# Patient Record
Sex: FEMALE | Race: WHITE | NOT HISPANIC OR LATINO | Employment: UNEMPLOYED | URBAN - METROPOLITAN AREA
[De-identification: names, ages, dates, MRNs, and addresses within clinical notes are randomized per-mention and may not be internally consistent; named-entity substitution may affect disease eponyms.]

---

## 2017-01-13 ENCOUNTER — ALLSCRIPTS OFFICE VISIT (OUTPATIENT)
Dept: OTHER | Facility: OTHER | Age: 42
End: 2017-01-13

## 2017-01-13 ENCOUNTER — TRANSCRIBE ORDERS (OUTPATIENT)
Dept: LAB | Facility: CLINIC | Age: 42
End: 2017-01-13

## 2017-01-13 DIAGNOSIS — R73.09 OTHER ABNORMAL GLUCOSE: ICD-10-CM

## 2017-01-13 DIAGNOSIS — E66.01 MORBID (SEVERE) OBESITY DUE TO EXCESS CALORIES (HCC): ICD-10-CM

## 2017-01-13 DIAGNOSIS — F32.9 MAJOR DEPRESSIVE DISORDER, SINGLE EPISODE: ICD-10-CM

## 2017-01-14 ENCOUNTER — GENERIC CONVERSION - ENCOUNTER (OUTPATIENT)
Dept: OTHER | Facility: OTHER | Age: 42
End: 2017-01-14

## 2017-01-14 LAB
A/G RATIO (HISTORICAL): 1.7 (ref 1.1–2.5)
ALBUMIN SERPL BCP-MCNC: 4.7 G/DL (ref 3.5–5.5)
ALP SERPL-CCNC: 54 IU/L (ref 39–117)
ALT SERPL W P-5'-P-CCNC: 45 IU/L (ref 0–32)
AMBIG ABBREV CMP14 DEFAULT (HISTORICAL): NORMAL
AMBIG ABBREV LP DEFAULT (HISTORICAL): NORMAL
AST SERPL W P-5'-P-CCNC: 31 IU/L (ref 0–40)
BASOPHILS # BLD AUTO: 0 X10E3/UL (ref 0–0.2)
BASOPHILS # BLD AUTO: 1 %
BILIRUB SERPL-MCNC: 0.4 MG/DL (ref 0–1.2)
BUN SERPL-MCNC: 16 MG/DL (ref 6–24)
BUN/CREA RATIO (HISTORICAL): 24 (ref 9–23)
CALCIUM SERPL-MCNC: 9.6 MG/DL (ref 8.7–10.2)
CHLORIDE SERPL-SCNC: 100 MMOL/L (ref 96–106)
CO2 SERPL-SCNC: 25 MMOL/L (ref 18–29)
CREAT SERPL-MCNC: 0.66 MG/DL (ref 0.57–1)
DEPRECATED RDW RBC AUTO: 13.2 % (ref 12.3–15.4)
EGFR AFRICAN AMERICAN (HISTORICAL): 124 ML/MIN/1.73
EGFR-AMERICAN CALC (HISTORICAL): 108 ML/MIN/1.73
EOSINOPHIL # BLD AUTO: 0 %
EOSINOPHIL # BLD AUTO: 0 X10E3/UL (ref 0–0.4)
GLUCOSE SERPL-MCNC: 112 MG/DL (ref 65–99)
HCT VFR BLD AUTO: 43.7 % (ref 34–46.6)
HGB BLD-MCNC: 15.1 G/DL (ref 11.1–15.9)
IMM.GRANULOCYTES (CD4/8) (HISTORICAL): 0 X10E3/UL (ref 0–0.1)
IMM.GRANULOCYTES (CD4/8) (HISTORICAL): 1 %
LYMPHOCYTES # BLD AUTO: 1 X10E3/UL (ref 0.7–3.1)
LYMPHOCYTES # BLD AUTO: 16 %
MCH RBC QN AUTO: 32.5 PG (ref 26.6–33)
MCHC RBC AUTO-ENTMCNC: 34.6 G/DL (ref 31.5–35.7)
MCV RBC AUTO: 94 FL (ref 79–97)
MONOCYTES # BLD AUTO: 1.1 X10E3/UL (ref 0.1–0.9)
MONOCYTES (HISTORICAL): 17 %
NEUTROPHILS # BLD AUTO: 4.2 X10E3/UL (ref 1.4–7)
NEUTROPHILS # BLD AUTO: 65 %
PLATELET # BLD AUTO: 329 X10E3/UL (ref 150–379)
POTASSIUM SERPL-SCNC: 4.7 MMOL/L (ref 3.5–5.2)
RBC (HISTORICAL): 4.65 X10E6/UL (ref 3.77–5.28)
SODIUM SERPL-SCNC: 143 MMOL/L (ref 134–144)
TOT. GLOBULIN, SERUM (HISTORICAL): 2.8 G/DL (ref 1.5–4.5)
TOTAL PROTEIN (HISTORICAL): 7.5 G/DL (ref 6–8.5)
WBC # BLD AUTO: 6.4 X10E3/UL (ref 3.4–10.8)

## 2017-01-15 LAB
BACTERIA UR QL AUTO: NORMAL
BILIRUB UR QL STRIP: NEGATIVE
CHOLEST SERPL-MCNC: 168 MG/DL (ref 100–199)
COLOR UR: YELLOW
COMMENT (HISTORICAL): CLEAR
FECAL OCCULT BLOOD DIAGNOSTIC (HISTORICAL): NEGATIVE
GLUCOSE (HISTORICAL): NEGATIVE
HBA1C MFR BLD HPLC: 5.4 % (ref 4.8–5.6)
HDLC SERPL-MCNC: 87 MG/DL
KETONES UR STRIP-MCNC: NEGATIVE MG/DL
LDLC SERPL CALC-MCNC: 61 MG/DL (ref 0–99)
LEUKOCYTE ESTERASE UR QL STRIP: NEGATIVE
MICROSCOPIC EXAMINATION (HISTORICAL): NORMAL
MICROSCOPIC EXAMINATION (HISTORICAL): NORMAL
MUCUS THREADS (HISTORICAL): PRESENT
NITRITE UR QL STRIP: NEGATIVE
NON-SQ EPI CELLS URNS QL MICRO: NORMAL /HPF
PH UR STRIP.AUTO: 6.5 [PH] (ref 5–7.5)
PROT UR STRIP-MCNC: NEGATIVE MG/DL
RBC (HISTORICAL): NORMAL /HPF
SP GR UR STRIP.AUTO: 1.02 (ref 1–1.03)
TRIGL SERPL-MCNC: 101 MG/DL (ref 0–149)
TSH SERPL DL<=0.05 MIU/L-ACNC: 1.12 UIU/ML (ref 0.45–4.5)
URINALYSIS (UA) (HISTORICAL): NORMAL
UROBILINOGEN UR QL STRIP.AUTO: 1 EU/DL (ref 0.2–1)
VLDLC SERPL CALC-MCNC: 20 MG/DL (ref 5–40)
WBC # BLD AUTO: NORMAL /HPF

## 2017-03-09 ENCOUNTER — TRANSCRIBE ORDERS (OUTPATIENT)
Dept: ADMINISTRATIVE | Facility: HOSPITAL | Age: 42
End: 2017-03-09

## 2017-03-09 ENCOUNTER — ALLSCRIPTS OFFICE VISIT (OUTPATIENT)
Dept: OTHER | Facility: OTHER | Age: 42
End: 2017-03-09

## 2017-03-09 DIAGNOSIS — R94.5 ABNORMAL RESULTS OF LIVER FUNCTION STUDIES: ICD-10-CM

## 2017-03-09 DIAGNOSIS — G35 MULTIPLE SCLEROSIS (HCC): Primary | ICD-10-CM

## 2017-03-09 DIAGNOSIS — G35 MULTIPLE SCLEROSIS (HCC): ICD-10-CM

## 2017-03-10 ENCOUNTER — APPOINTMENT (OUTPATIENT)
Dept: LAB | Facility: HOSPITAL | Age: 42
End: 2017-03-10
Attending: PSYCHIATRY & NEUROLOGY
Payer: COMMERCIAL

## 2017-03-10 ENCOUNTER — GENERIC CONVERSION - ENCOUNTER (OUTPATIENT)
Dept: OTHER | Facility: OTHER | Age: 42
End: 2017-03-10

## 2017-03-10 DIAGNOSIS — G35 MULTIPLE SCLEROSIS (HCC): ICD-10-CM

## 2017-03-10 LAB
ALBUMIN SERPL BCP-MCNC: 3.8 G/DL (ref 3.5–5)
ALP SERPL-CCNC: 70 U/L (ref 46–116)
ALT SERPL W P-5'-P-CCNC: 91 U/L (ref 12–78)
ANION GAP SERPL CALCULATED.3IONS-SCNC: 8 MMOL/L (ref 4–13)
AST SERPL W P-5'-P-CCNC: 46 U/L (ref 5–45)
BASOPHILS # BLD AUTO: 0.02 THOUSANDS/ΜL (ref 0–0.1)
BASOPHILS NFR BLD AUTO: 0 % (ref 0–1)
BILIRUB SERPL-MCNC: 0.4 MG/DL (ref 0.2–1)
BUN SERPL-MCNC: 18 MG/DL (ref 5–25)
CALCIUM SERPL-MCNC: 9.4 MG/DL (ref 8.3–10.1)
CHLORIDE SERPL-SCNC: 103 MMOL/L (ref 100–108)
CO2 SERPL-SCNC: 30 MMOL/L (ref 21–32)
CREAT SERPL-MCNC: 0.77 MG/DL (ref 0.6–1.3)
EOSINOPHIL # BLD AUTO: 0 THOUSAND/ΜL (ref 0–0.61)
EOSINOPHIL NFR BLD AUTO: 0 % (ref 0–6)
ERYTHROCYTE [DISTWIDTH] IN BLOOD BY AUTOMATED COUNT: 12.6 % (ref 11.6–15.1)
GFR SERPL CREATININE-BSD FRML MDRD: >60 ML/MIN/1.73SQ M
GLUCOSE SERPL-MCNC: 82 MG/DL (ref 65–140)
HCT VFR BLD AUTO: 42.7 % (ref 34.8–46.1)
HGB BLD-MCNC: 14.5 G/DL (ref 11.5–15.4)
LYMPHOCYTES # BLD AUTO: 1.64 THOUSANDS/ΜL (ref 0.6–4.47)
LYMPHOCYTES NFR BLD AUTO: 25 % (ref 14–44)
MCH RBC QN AUTO: 32.9 PG (ref 26.8–34.3)
MCHC RBC AUTO-ENTMCNC: 34 G/DL (ref 31.4–37.4)
MCV RBC AUTO: 97 FL (ref 82–98)
MONOCYTES # BLD AUTO: 0.77 THOUSAND/ΜL (ref 0.17–1.22)
MONOCYTES NFR BLD AUTO: 12 % (ref 4–12)
NEUTROPHILS # BLD AUTO: 4.07 THOUSANDS/ΜL (ref 1.85–7.62)
NEUTS SEG NFR BLD AUTO: 62 % (ref 43–75)
NRBC BLD AUTO-RTO: 0 /100 WBCS
PLATELET # BLD AUTO: 333 THOUSANDS/UL (ref 149–390)
PMV BLD AUTO: 9.8 FL (ref 8.9–12.7)
POTASSIUM SERPL-SCNC: 4.1 MMOL/L (ref 3.5–5.3)
PROT SERPL-MCNC: 7.6 G/DL (ref 6.4–8.2)
RBC # BLD AUTO: 4.41 MILLION/UL (ref 3.81–5.12)
SODIUM SERPL-SCNC: 141 MMOL/L (ref 136–145)
WBC # BLD AUTO: 6.56 THOUSAND/UL (ref 4.31–10.16)

## 2017-03-10 PROCEDURE — 80053 COMPREHEN METABOLIC PANEL: CPT

## 2017-03-10 PROCEDURE — 85025 COMPLETE CBC W/AUTO DIFF WBC: CPT

## 2017-03-10 PROCEDURE — 83520 IMMUNOASSAY QUANT NOS NONAB: CPT

## 2017-03-10 PROCEDURE — 36415 COLL VENOUS BLD VENIPUNCTURE: CPT

## 2017-03-13 LAB — AQP4 H2O CHANNEL AB SERPL IA-ACNC: <1.5 U/ML (ref 0–3)

## 2017-03-21 ENCOUNTER — APPOINTMENT (OUTPATIENT)
Dept: LAB | Facility: HOSPITAL | Age: 42
End: 2017-03-21
Payer: COMMERCIAL

## 2017-03-21 ENCOUNTER — ALLSCRIPTS OFFICE VISIT (OUTPATIENT)
Dept: OTHER | Facility: OTHER | Age: 42
End: 2017-03-21

## 2017-03-21 ENCOUNTER — TRANSCRIBE ORDERS (OUTPATIENT)
Dept: LAB | Facility: HOSPITAL | Age: 42
End: 2017-03-21

## 2017-03-21 DIAGNOSIS — R94.5 NONSPECIFIC ABNORMAL RESULTS OF LIVER FUNCTION STUDY: ICD-10-CM

## 2017-03-21 DIAGNOSIS — R94.5 ABNORMAL RESULTS OF LIVER FUNCTION STUDIES: ICD-10-CM

## 2017-03-21 DIAGNOSIS — R94.5 NONSPECIFIC ABNORMAL RESULTS OF LIVER FUNCTION STUDY: Primary | ICD-10-CM

## 2017-03-21 LAB
ALBUMIN SERPL BCP-MCNC: 3.7 G/DL (ref 3.5–5)
ALP SERPL-CCNC: 63 U/L (ref 46–116)
ALT SERPL W P-5'-P-CCNC: 64 U/L (ref 12–78)
AST SERPL W P-5'-P-CCNC: 28 U/L (ref 5–45)
BILIRUB DIRECT SERPL-MCNC: 0.13 MG/DL (ref 0–0.2)
BILIRUB SERPL-MCNC: 0.5 MG/DL (ref 0.2–1)
ERYTHROCYTE [DISTWIDTH] IN BLOOD BY AUTOMATED COUNT: 12.6 % (ref 11.6–15.1)
HCT VFR BLD AUTO: 44.2 % (ref 34.8–46.1)
HGB BLD-MCNC: 15.1 G/DL (ref 11.5–15.4)
INR PPP: 0.96 (ref 0.86–1.16)
MCH RBC QN AUTO: 33 PG (ref 26.8–34.3)
MCHC RBC AUTO-ENTMCNC: 34.2 G/DL (ref 31.4–37.4)
MCV RBC AUTO: 97 FL (ref 82–98)
PLATELET # BLD AUTO: 332 THOUSANDS/UL (ref 149–390)
PMV BLD AUTO: 10.2 FL (ref 8.9–12.7)
PROT SERPL-MCNC: 7.5 G/DL (ref 6.4–8.2)
PROTHROMBIN TIME: 12.7 SECONDS (ref 12–14.3)
RBC # BLD AUTO: 4.57 MILLION/UL (ref 3.81–5.12)
WBC # BLD AUTO: 7.28 THOUSAND/UL (ref 4.31–10.16)

## 2017-03-21 PROCEDURE — 87350 HEPATITIS BE AG IA: CPT

## 2017-03-21 PROCEDURE — 86704 HEP B CORE ANTIBODY TOTAL: CPT

## 2017-03-21 PROCEDURE — 85027 COMPLETE CBC AUTOMATED: CPT

## 2017-03-21 PROCEDURE — 82390 ASSAY OF CERULOPLASMIN: CPT

## 2017-03-21 PROCEDURE — 80076 HEPATIC FUNCTION PANEL: CPT

## 2017-03-21 PROCEDURE — 86803 HEPATITIS C AB TEST: CPT

## 2017-03-21 PROCEDURE — 86256 FLUORESCENT ANTIBODY TITER: CPT

## 2017-03-21 PROCEDURE — 86708 HEPATITIS A ANTIBODY: CPT

## 2017-03-21 PROCEDURE — 36415 COLL VENOUS BLD VENIPUNCTURE: CPT

## 2017-03-21 PROCEDURE — 85610 PROTHROMBIN TIME: CPT

## 2017-03-21 PROCEDURE — 87340 HEPATITIS B SURFACE AG IA: CPT

## 2017-03-22 ENCOUNTER — HOSPITAL ENCOUNTER (OUTPATIENT)
Dept: MRI IMAGING | Facility: CLINIC | Age: 42
Discharge: HOME | End: 2017-03-22
Payer: COMMERCIAL

## 2017-03-22 DIAGNOSIS — G35 MULTIPLE SCLEROSIS (HCC): ICD-10-CM

## 2017-03-22 LAB
CERULOPLASMIN SERPL-MCNC: 31.9 MG/DL (ref 19–39)
HAV AB SER QL IA: REACTIVE
HBV CORE AB SER QL: NORMAL
HBV E AG SERPL QL IA: NEGATIVE
HBV SURFACE AG SER QL: NORMAL
HCV AB SER QL: NORMAL
MITOCHONDRIA M2 IGG SER-ACNC: 3.5 UNITS (ref 0–20)

## 2017-03-22 PROCEDURE — A9585 GADOBUTROL INJECTION: HCPCS | Performed by: PSYCHIATRY & NEUROLOGY

## 2017-03-22 PROCEDURE — 70553 MRI BRAIN STEM W/O & W/DYE: CPT

## 2017-03-22 RX ADMIN — GADOBUTROL 10 ML: 604.72 INJECTION INTRAVENOUS at 12:41

## 2017-03-24 ENCOUNTER — HOSPITAL ENCOUNTER (OUTPATIENT)
Dept: MRI IMAGING | Facility: CLINIC | Age: 42
Discharge: HOME | End: 2017-03-24
Payer: COMMERCIAL

## 2017-03-24 DIAGNOSIS — G35 MULTIPLE SCLEROSIS (HCC): ICD-10-CM

## 2017-03-24 PROCEDURE — A9585 GADOBUTROL INJECTION: HCPCS | Performed by: PSYCHIATRY & NEUROLOGY

## 2017-03-24 PROCEDURE — 72157 MRI CHEST SPINE W/O & W/DYE: CPT

## 2017-03-24 PROCEDURE — 72156 MRI NECK SPINE W/O & W/DYE: CPT

## 2017-03-24 RX ADMIN — GADOBUTROL 12 ML: 604.72 INJECTION INTRAVENOUS at 14:41

## 2017-03-29 ENCOUNTER — HOSPITAL ENCOUNTER (OUTPATIENT)
Dept: ULTRASOUND IMAGING | Facility: HOSPITAL | Age: 42
Discharge: HOME | End: 2017-03-29
Payer: COMMERCIAL

## 2017-03-29 DIAGNOSIS — R94.5 ABNORMAL RESULTS OF LIVER FUNCTION STUDIES: ICD-10-CM

## 2017-03-29 PROCEDURE — 76700 US EXAM ABDOM COMPLETE: CPT

## 2017-04-05 ENCOUNTER — ALLSCRIPTS OFFICE VISIT (OUTPATIENT)
Dept: OTHER | Facility: OTHER | Age: 42
End: 2017-04-05

## 2017-04-05 ENCOUNTER — GENERIC CONVERSION - ENCOUNTER (OUTPATIENT)
Dept: OTHER | Facility: OTHER | Age: 42
End: 2017-04-05

## 2017-04-18 ENCOUNTER — TRANSCRIBE ORDERS (OUTPATIENT)
Dept: ADMINISTRATIVE | Facility: HOSPITAL | Age: 42
End: 2017-04-18

## 2017-04-18 DIAGNOSIS — R25.1 TREMOR: Primary | ICD-10-CM

## 2017-04-18 DIAGNOSIS — G35 MULTIPLE SCLEROSIS (HCC): ICD-10-CM

## 2017-04-24 ENCOUNTER — HOSPITAL ENCOUNTER (OUTPATIENT)
Dept: NEUROLOGY | Facility: HOSPITAL | Age: 42
Discharge: HOME | End: 2017-04-24
Attending: PSYCHIATRY & NEUROLOGY
Payer: COMMERCIAL

## 2017-04-24 DIAGNOSIS — R25.1 TREMOR: ICD-10-CM

## 2017-04-24 PROCEDURE — 95816 EEG AWAKE AND DROWSY: CPT

## 2017-04-26 ENCOUNTER — ALLSCRIPTS OFFICE VISIT (OUTPATIENT)
Dept: OTHER | Facility: OTHER | Age: 42
End: 2017-04-26

## 2017-05-18 ENCOUNTER — GENERIC CONVERSION - ENCOUNTER (OUTPATIENT)
Dept: OTHER | Facility: OTHER | Age: 42
End: 2017-05-18

## 2017-05-19 ENCOUNTER — ALLSCRIPTS OFFICE VISIT (OUTPATIENT)
Dept: OTHER | Facility: OTHER | Age: 42
End: 2017-05-19

## 2017-05-19 DIAGNOSIS — G35 MULTIPLE SCLEROSIS (HCC): ICD-10-CM

## 2017-05-23 ENCOUNTER — HOSPITAL ENCOUNTER (OUTPATIENT)
Dept: NEUROLOGY | Facility: HOSPITAL | Age: 42
Discharge: HOME | End: 2017-05-23
Attending: PSYCHIATRY & NEUROLOGY
Payer: COMMERCIAL

## 2017-05-23 ENCOUNTER — GENERIC CONVERSION - ENCOUNTER (OUTPATIENT)
Dept: OTHER | Facility: OTHER | Age: 42
End: 2017-05-23

## 2017-05-23 DIAGNOSIS — G35 MULTIPLE SCLEROSIS (HCC): ICD-10-CM

## 2017-05-23 PROCEDURE — 95819 EEG AWAKE AND ASLEEP: CPT

## 2017-07-26 ENCOUNTER — HOSPITAL ENCOUNTER (OUTPATIENT)
Dept: RADIOLOGY | Facility: HOSPITAL | Age: 42
Discharge: HOME | End: 2017-07-26
Payer: COMMERCIAL

## 2017-07-26 ENCOUNTER — TRANSCRIBE ORDERS (OUTPATIENT)
Dept: ADMINISTRATIVE | Facility: HOSPITAL | Age: 42
End: 2017-07-26

## 2017-07-26 DIAGNOSIS — M47.812 CERVICAL SPONDYLOSIS WITHOUT MYELOPATHY: ICD-10-CM

## 2017-07-26 DIAGNOSIS — M47.812 CERVICAL SPONDYLOSIS WITHOUT MYELOPATHY: Primary | ICD-10-CM

## 2017-07-26 PROCEDURE — 72040 X-RAY EXAM NECK SPINE 2-3 VW: CPT

## 2017-08-18 ENCOUNTER — ALLSCRIPTS OFFICE VISIT (OUTPATIENT)
Dept: OTHER | Facility: OTHER | Age: 42
End: 2017-08-18

## 2017-10-05 DIAGNOSIS — K76.0 FATTY (CHANGE OF) LIVER, NOT ELSEWHERE CLASSIFIED: ICD-10-CM

## 2018-01-12 NOTE — CONSULTS
Assessment    1  Multiple sclerosis (340) (G35)    Plan   Multiple sclerosis    · Copaxone 40 MG/ML Subcutaneous Solution Prefilled Syringe; INJECT 40 MG  UNDER THE SKIN THREE TIMES A WEEK   Rx By: Hansa Stiles; Dispense: 0 Days ; #:12 ML; Refill: 10; For: Multiple sclerosis; TIMOTHY = N; Verified Transmission to [id number]   · Gabapentin 300 MG Oral Capsule; TAKE 2 CAPSULE Twice daily   Rx By: Hansa Stiles; Dispense: 0 Days ; #:60 Capsule; Refill: 2; For: Multiple sclerosis; TIMOTHY = N; Record   · (1) CBC/PLT/DIFF; Status:Active; Requested YMP:[id number];    Perform:[facility]; VUA:[id number]; Ordered; For:Multiple sclerosis; Ordered By:Gomez Vaughan;   · (1) COMPREHENSIVE METABOLIC PANEL; Status:Active; Requested BZQ:[id number];    Perform:[facility]; [id number]; Ordered; For:Multiple sclerosis; Ordered By:Ray Vaughan;   · (1) NMO IgG AUTOANTIBODIES; Status:Active; Requested GWC:[id number];    Perform:[facility]; RUN:[id number]; Ordered; For:Multiple sclerosis; Ordered By:Gomez Vaughan;   · * MRI BRAIN MS WO AND W CONTRAST; Status:Need Information - Financial  Authorization; Requested [id number];    Perform:[facility]; CFD:[id number]; Ordered; For:Multiple sclerosis; Ordered By:Gomez Vaughan;   · * MRI CERVICAL SPINE W WO CONTRAST; Status:Need Information - Financial  Authorization; Requested XIL:[id number];    Perform:[facility]; TRR:[id number]; Ordered; For:Multiple sclerosis; Ordered By:Ray Vaughan;   · * MRI THORACIC SPINE W WO CONTRAST; Status:Need Information - Financial  Authorization; Requested KIARA:[id number];    Perform:[facility]; WYL:[id number]; Ordered; For:Multiple sclerosis; Ordered By:Gomez Vaughan;   · *1 - [facility] Physical Therapy  Consult  Status: Active  Requested for:  [id number]   Ordered; For: Multiple sclerosis;  Ordered By: Hansa Stiles Performed:  Due: 15QUK1799  Care Summary provided  : Yes   · 1 - Gregg Jara MD, [facility] Phys[facility] Referral  Consult Only: the expectation  is that the referring provider will communicate back to the patient on treatment options  Evaluation and Treatment: the expectation is that the referred to provider will  communicate back to the patient on treatment options  Status: Active  Requested for:  54SJP0579   Ordered; For: Multiple sclerosis; Ordered By: Jd Hodgson Performed:  Due: 24GCO3633  Care Summary provided  : Yes    Follow-up visit in 2 months Evaluation and Treatment  Follow-up  Status: Hold For - Scheduling  Requested for: 83QPF2386  Ordered; For: Multiple sclerosis;   Ordered By: Jd Hodgson  Performed:   Due: 29PQT6857     Discussion/Summary  Discussion Summary:   Patient with history of MS currently not on any immunomodulating medications, I discussed with patient different medications both oral and injectables, she does not want to go back on Tecfidera or other oral medications, she would like to go back on Copaxone since she had felt the best on that, side effects of the medication discussed with the patient she had not had any side effects, I explained to her that her ALT was slightly elevated, we will repeat the blood work, I did give her a prescription for Copaxone 40 mg subcutaneous 3 times a week since it may take 1 or 2 weeks for her to get preauthorized, she's not going to start the medication until she has discussed with us regarding her blood work and make sure that her LFTs have normalized, also would recommend repeating the MRI scan of the brain cervical thoracic spine with and without contrast, and blood work, she will call me after the above test to discuss the results, side effects of Copaxone were discussed in detail, she denies any side effects to the medication in the past, and she has stopped taking the Tecfidera for the last 2 months, she was advised to go for physical therapy, also would recommend patient to be seen by an [address] [place] [address],  [address] at [place] [address] [place], she's going to follow-up with her family physician for her history of increased lupus anticoagulant and other issues, go to the hospital if has any worsening symptoms and call me otherwise to see me back in 2 months and follow-up with her other physicians  Medication SE Review and Pt Understands Tx: Possible side effects of new medications were reviewed with the patient/guardian today  The treatment plan was reviewed with the patient/guardian  The patient/guardian understands and agrees with the treatment plan   Counseling Documentation With Imm: The was counseled regarding diagnostic results, risk factor reductions, prognosis, patient and family education, risks and benefits of treatment options, importance of compliance with treatment  Chief Complaint  Chief Complaint Free Text Note Form: Patient is a 38year old right handed lady referred by Dr Soheila Champagne for history of MS  Patient was last seen by Dr Karyn Hill 1 year ago and has not been back due to insurance changes        History of Present Illness  HPI: Patient is here for evaluation for her history of MS, patient has been diagnosed with MS more than 10 years ago, her initial symptoms were headaches and lower extremity weakness and had an MRI scan of the brain and spinal tap and was diagnosed to have MS and started on Rebiff which she continued for about 3 years and apparently she had twitching of her face and upper extremities it is not clear at that time if she was on combination of Rebiff and Copaxone and later on she continued Copaxone alone for a few years until 2010 when she lost her medical insurance and could not afford to continue the Copaxone she reports that her symptoms have been gradually progressive since 2011 she does have a history of relapsed in 2011 having facial weakness she saw a neurologist in [place] in December 2015 and was started in January 2016 on Tecfidera and according to the patient she had been taking that until January of this year but she did not feel that it was helping her much and she switched insurances and is no longer able to follow-up with the neurologist in [place] and is not taking Tecfedira and would like to go back on Copaxone, she describes her current symptoms include fatigue stuttering speech numbness and tingling of both feet urinary retention at times with urinary incontinence diffuse joint pains and body pains, she has had an MRI scan of the C-spine in December 2007 that showed patchy T2 hyperintense focus measuring 2 5 and to 17 mm in the right spinal cord at the level of C3 with minimal enhancement, MRI of the thoracic spine did not show evidence of any cord lesions she did have a repeat MRI scan of the brain and cervical spine in January 2016 that did show some new lesions in the brain without any enhancement and there was a cervical cord lesion without any enhancement,      Review of Systems  Neurological ROS:   Constitutional: fatigue  HEENT:  no sinus problems, not feeling congested, no blurred vision, no dryness of the eyes, no eye pain, no hearing loss, no tinnitus, no mouth sores, no sore throat, no hoarseness, no dysphagia, no masses, no bleeding  Cardiovascular: swelling in the arms or legs  Respiratory:  no unusual or persistant cough, no shortness of breath with or without exertion  Genitourinary: incontinence  Musculoskeletal: arthralgias, myalgias, head/neck/back pain and pain while walking  Integumentary  no masses, no rash, no skin lesions, no livedo reticularis  Psychiatric: anxiety and depression  Endocrine loss of sexual ability or drive   Hematologic/Lymphatic:  no unusual bleeding, no tendency for easy bruising, no clotting skin or lumps  Neurological General: increased sleepiness, trouble falling asleep and waking up at night     Neurological Mental Status:  no confusion, no mood swings, no alteration or loss of consciousness, no difficulty expressing/understanding speech, no memory problems  Neurological Cranial Nerves:  no blurry or double vision, no loss of vision, no face drooping, no facial numbness or weakness, no taste or smell loss/changes, no hearing loss or ringing, no vertigo or dizziness, no dysphagia, no slurred speech  Neurological Motor findings include: twitching  Neurological Coordination: balance difficulties, clumsiness and problems reaching for objects  Neurological Sensory: numbness and tingling  Neurological Gait:  no difficulty walking, not falling to one side, no sensation of being pushed, has not had falls  Active Problems    1  Abdominal bloating (787 3) (R14 0)   2  Abdominal pain, bilateral lower quadrant (789 03,789 04) (R10 31,R10 32)   3  Abnormal blood sugar (790 29) (R73 09)   4  Acute bronchitis (466 0) (J20 9)   5  Alternating constipation and diarrhea (787 99) (R19 8)   6  Anxiety (300 00) (F41 9)   7  Cervical radiculopathy (723 4) (M54 12)   8  Constipation (564 00) (K59 00)   9  Depression (311) (F32 9)   10  Diarrhea (787 91) (R19 7)   11  Dyspepsia (536 8) (K30)   12  Dysphagia, pharyngoesophageal phase (787 24) (R13 14)   13  Fatigue (780 79) (R53 83)   14  Hiatal hernia (553 3) (K44 9)   15  Hyperthyroidism (242 90) (E05 90)   16  Morbid obesity (278 01) (E66 01)   17  Multiple sclerosis (340) (G35)   18  Plantar fasciitis (728 71) (M72 2)   19  Seasonal allergies (477 9) (J30 2)   20  URI, acute (465 9) (J06 9)    Surgical History    1  History of Anal Sphincterectomy   2  History of Cervical Conization   3  History of Sinus Surgery    Family History  Mother    1  Family history of Benign essential hypertension   2  Family history of Diverticulitis   3  Family history of cerebral aneurysm (V17 1) (Z82 49)   4  Family history of cerebral infarction (V17 1) (Z82 3)   5  Family history of glaucoma (V19 11) (Z83 511)   6  Family history of malignant neoplasm of breast (V16 3) (Z80 3)   7   Family history of skin cancer (V16 8) (Z80 8)  Father    8  Family history of Benign essential hypertension   9  Family history of Diverticulitis   10  Family history of cerebral infarction (V17 1) (Z82 3)   11  Family history of glaucoma (V19 11) (Z83 511)   12  Family history of hepatic cirrhosis (V18 59) (Z83 79)   13  Family history of lung cancer (V16 1) (Z80 1)   14  Family history of skin cancer (V16 8) (Z80 8)  Maternal Grandmother    13  Family history of Benign essential hypertension   16  Family history of cerebral infarction (V17 1) (Z82 3)   17  Family history of hepatic cirrhosis (V18 59) (Z83 79)   18  Family history of lung cancer (V16 1) (Z80 1)  Paternal Grandmother    23  Family history of lung cancer (V16 1) (Z80 1)  Paternal Grandfather    21  Family history of hepatic cirrhosis (V18 59) (Z83 79)   21  Family history of lung cancer (V16 1) (Z80 1)    Social History    · Former smoker (B05 81) (H49 229)   ·    · Nicotine vapor product user (V49 89) (Z78 9)   · No drug use   · Occasional alcohol use   · Denied: History of    · Unemployed, looking for work    Current Meds   1  Effexor 75 MG TABS; TAKE 3 TABLET Every morning; Therapy: (Recorded:09Mar2017) to Recorded   2  Fluticasone Propionate 50 MCG/ACT Nasal Suspension; USE 2 SPRAYS IN EACH   NOSTRIL ONCE DAILY; Therapy: [id number] to (Last Rx:30Jun2015)  Requested for: 30Jun2015 Ordered   3  Gabapentin 300 MG Oral Capsule; TAKE 2 CAPSULE Twice daily; Therapy: (Recorded:09Mar2017) to Recorded   4  Ibuprofen 200 MG Oral Capsule; TAKE CAPSULE  4 caps 3-4 x a day; Therapy: (Recorded:09Mar2017) to Recorded   5  Loratadine-D 24HR TB24; Take 1 tablet daily; Therapy: (Recorded:09Mar2017) to Recorded    Allergies    1  No Known Drug Allergies    2   Latex    Vitals  Signs   Recorded: 38PNO7068 02:02PM   Heart Rate: 74  Systolic: 572, LUE, Sitting  Diastolic: 91, LUE, Sitting  Height: 5 ft 7 in  Weight: 273 lb   BMI Calculated: 42 76  BSA Calculated: 2 31    Physical Exam  No spine tenderness     Constitutional   General Appearance: Appears appropriate for age, healthy, well developed, appropriately groomed and appropriately dressed    Eyes   Ophthalmoscopic examination: Vision is grossly normal  Gross visual field testing by confrontation shows no abnormalities  EOMI in both eyes  Conjunctivae clear  Eyelids normal palpebral fissures equal  Orbits exhibit normal position  No discharge from the eyes  PERRL  Funduscopic examination could not be done  Cardiovascular   Carotid pulses: Normal, 2+ bilaterally  Peripheral vascular exam: Normal pulses throughout, no tenderness, erythema or swelling  Musculoskeletal   Gait and Station: Walks with normal gait  Tandem walk test is normal  Romberg's test is negative  Muscle strength: Normal strength throughout  Muscle tone: No atrophy, abnormal movements, flaccidity, cogwheeling or spasticity  Range of motion: Normal     Stability: Normal     Inspection of temporomandibular joint appears normal     Neurologic   Orientation to person, place, and time: Normal     Attention span and concentration: Normal thought process and attention span  Language: Names objects, able to repeat phrases and speaks spontaneously  Fund of knowledge: Normal vocabulary with appropriate knowledge of current events and past history  Sensation: Abnormal   Decreased light touch pinprick temperature sensation in bilateral lower extremities up to the knees left little more prominent than the right  Reflexes: Abnormal   Brisk at bilateral lower extremities with upgoing toes bilaterally and 2+ in the upper extremities  Coordination: Abnormal   Finger to nose is intact bilaterally with slight difficulty in tandem walking  Motor System: No pronator drift  Upper Extremities: Normal to inspection  No tenderness over the upper extremities bilaterally  No instability bilaterally  Strength:  Motor strength is 5/5 bilaterally  Normal muscle tone bilaterally  Muscle bulk: Muscle bulk is normal bilaterally  Full ROM bilaterally  Lower Extremities: Normal to inspection and palpation  No tenderness of the lower extremities bilaterally  Exhibits no instability bilaterally  Strength: Motor strength is 5/5 bilaterally  Normal muscle tone bilaterally  Muscle Bulk: Muscle bulk is normal bilaterally  Full ROM bilaterally  Cranial Nerve Exam   II: Normal with no deficit  III,IV, VI: Normal with no deficit  V: Normal with no deficit  VII: Normal with no deficit  VIII: Normal with no deficit  IX: Normal with no deficit  X: Normal with no deficit  XI: Normal with no deficit  XII: Normal with no deficit  Recent and remote memory: Intact      Mood and affect: Normal        Signatures   Electronically signed by : Holly Wooten MD; Mar  9 2017  2:55PM EST                       (Author)

## 2018-01-13 VITALS
OXYGEN SATURATION: 99 % | BODY MASS INDEX: 42.69 KG/M2 | WEIGHT: 272 LBS | DIASTOLIC BLOOD PRESSURE: 80 MMHG | HEART RATE: 80 BPM | HEIGHT: 67 IN | SYSTOLIC BLOOD PRESSURE: 122 MMHG | RESPIRATION RATE: 16 BRPM

## 2018-01-13 VITALS
HEIGHT: 67 IN | DIASTOLIC BLOOD PRESSURE: 86 MMHG | BODY MASS INDEX: 42.69 KG/M2 | SYSTOLIC BLOOD PRESSURE: 126 MMHG | WEIGHT: 272 LBS

## 2018-01-13 VITALS
WEIGHT: 265 LBS | OXYGEN SATURATION: 96 % | HEART RATE: 72 BPM | BODY MASS INDEX: 41.5 KG/M2 | DIASTOLIC BLOOD PRESSURE: 88 MMHG | SYSTOLIC BLOOD PRESSURE: 142 MMHG | TEMPERATURE: 98.5 F

## 2018-01-13 NOTE — RESULT NOTES
Verified Results  * 58 Keisha Sloan [id number] 10:31AM Jack Richardson Order Number: [id number]    - Patient Instructions: To schedule this appointment, please contact Central Scheduling at [phone]  Test Name Result Flag Reference   US ABDOMEN COMPLETE (Report)     ABDOMEN ULTRASOUND, COMPLETE     INDICATION: Elevated LFTs  COMPARISON: None  TECHNIQUE:  Real-time ultrasound of the abdomen was performed with a curvilinear transducer with both volumetric sweeps and still imaging techniques  FINDINGS:     PANCREAS: Visualized portions of the pancreas are within normal limits  AORTA AND IVC: Visualized portions are normal for patient age  LIVER:   Size: Mildly enlarged  The liver measures 17 4 cm in the midclavicular line  Contour: Surface contour is smooth  Parenchyma: There is mild diffuse increased echogenicity with smooth echotexture, without significant beam attenuation or loss of periportal echogenicity  Most consistent with mild hepatic steatosis  No evidence of suspicious mass  The main portal vein is patent and hepatopetal       BILIARY:   The gallbladder is normal in caliber  No wall thickening or pericholecystic fluid  No stones or sludge identified  Sonographic Lindle Edelson sign is negative  No intrahepatic biliary dilatation  CBD measures 8 mm  No choledocholithiasis  KIDNEY:    Right kidney measures 9 8 x 3 2 cm  Within normal limits  Left kidney measures 11 8 x 5 9 cm  Within normal limits  SPLEEN:    Measures 8 7 x 5 1 cm  Within normal limits  ASCITES: None  IMPRESSION:     No acute intra-abdominal abnormality  No gallstone/sludge, pericholecystic fluid or wall thickening  Mildly enlarged fatty liver         Workstation performed: [id number]     Signed by:   Ben Schaeffer MD   3/29/17

## 2018-01-14 VITALS
BODY MASS INDEX: 43 KG/M2 | HEIGHT: 67 IN | DIASTOLIC BLOOD PRESSURE: 76 MMHG | WEIGHT: 274 LBS | SYSTOLIC BLOOD PRESSURE: 120 MMHG

## 2018-01-14 VITALS
WEIGHT: 273 LBS | BODY MASS INDEX: 42.85 KG/M2 | DIASTOLIC BLOOD PRESSURE: 91 MMHG | HEIGHT: 67 IN | SYSTOLIC BLOOD PRESSURE: 139 MMHG | HEART RATE: 74 BPM

## 2018-01-15 VITALS
WEIGHT: 272 LBS | HEIGHT: 67 IN | DIASTOLIC BLOOD PRESSURE: 82 MMHG | HEART RATE: 80 BPM | BODY MASS INDEX: 42.69 KG/M2 | SYSTOLIC BLOOD PRESSURE: 122 MMHG

## 2018-01-16 NOTE — PROCEDURES
Procedures by Ivonne Xiong MD at  2017  9:13 AM      Author:  Ivonne Xiong MD Service:  Neurology Author Type:  Physician     Filed:  2017 10:21 AM Date of Service:  2017  9:13 AM Status:  Signed     :  Ivonne Xiong MD (Physician)            ELECTROENCEPHALOGRAM    Patient Name:  Michael Santamaria  MRN: [id number]   :  1975 File #: Melinda Velasquez 12-   Age: 38 y o  Encounter #: [id number]   Date performed: 2017 Referring Provider: Taylor Alicia MD          Report date: 2017          Study type: sleep deprived EEG    ICD 10 diagnosis: Spells/Fit NOS R56 9 and other abnormal involuntary movements R25 8    Patient History:  EEG is requested to assess for seizures and/or classification of epilepsy  Patient is 38 y o  female with multiple sclerosis, who has been having recurrent episodes of involuntary movements of her arms and legs when changing positions (example  when she stands up she has spastic involuntary limb movements) and left hand tremors    Current AEDs: gabapentin  Medications include: Effexor, Copaxone, Zyrtec, Flonase    Description of Procedure: The EEG was performed with electrodes applied using the International 10-20 System  Additional electrodes used included EOG, ECG and T1/T2 electrodes  A single lead ECG channel is also  present  At least 16 channels are reviewed at a time  and formatted into longitudinal bipolar, transverse bipolar, and referential (to common reference or calculated common reference) montages  The EEG was recorded  with the patient awake, drowsy, and asleep  The recording was technically satisfactory  EEG was recorded from 08:20 to 08:51  Findings:   Background Activity: The background is symmetric with respect to voltages and activity  During wakefulness, the background is well-organized  with anterior low amplitude beta activity and low-moderate amplitude posterior alpha activity    There is a symmetric 9-9 5  Hz posterior dominant rhythm that attenuates with eye opening  Drowsiness is characterized by attentuation of the alpha rhythm, prominent anterior beta activity, central theta activity, roving eye movements, vertex waves, positive occipital sharp transients of sleep  (POSTS) and benign epileptiform transients of sleep (BETS)  Stage 2 sleep is characterized by symmetric sleep spindles and K-complexes  Activation Procedures:   Hyperventilation was performed with fair effort up to 4 minutes and did not produce any abnormalities  Stepped photic stimulation from 1 to 30 fps was performed and produced no abnormality  Other findings: The single lead ECG shows a regular and sinus rhythm  Events:   08:25 - the EEG tech tried to provoke one of her episodes by having the patient stand up momentarily  There was no involuntary movement  Interpretation: This is a normal 31 minutes awake, drowsy, and asleep EEG  The lack of epileptiform discharges on a routine EEG does not preclude the diagnosis of seizures or epilepsy  Focal motor seizure can be missed on scalp EEG  Continuous video EEG monitoring may help clarify the diagnosis to capture a clinical event with simultaneous EEG recording  MD Robe Esparza [facility]  Alvaro HDEZ    May 23 2017 10:21AM Eastern Standard Time

## 2018-01-17 NOTE — MISCELLANEOUS
Provider Comments  Provider Comments:   1st no show appointment for Neurology 8/18/17 @ 1:30pm  No call, no message received  [facility] patients home and had to leave a message on patients voicemail to call our office back        Signatures   Electronically signed by : ATIYA Leon ; Aug 21 2017  4:46PM EST                       (Author)

## 2018-01-18 NOTE — MISCELLANEOUS
Message  Discussed with patient regarding her blood work results, advised the patient not to start on Copaxone until seen by the GI and cleared by them and her LFTs have normalized  Patient is agreeable      Plan  Abnormal blood chemistry    · 1 - Roberto PARK, [facility] Physician Referral  Consult Only: the  expectation is that the referring provider will communicate back to the patient on  treatment options  Evaluation and Treatment: the expectation is that the referred to  provider will communicate back to the patient on treatment options      Patient with increased LFTs please advise if patient can go on Copaxone for her MS   Status: Active  Requested for: [id number]  Care Summary provided  : Yes    Signatures   Electronically signed by : Kristina Santana MD; Mar 10 2017 11:39AM EST                       (Author)

## 2018-02-16 RX ORDER — MULTIVITAMIN WITH IRON
TABLET ORAL
COMMUNITY
End: 2018-04-21 | Stop reason: ALTCHOICE

## 2018-02-16 RX ORDER — GLATIRAMER 40 MG/ML
INJECTION, SOLUTION SUBCUTANEOUS
COMMUNITY
Start: 2017-03-09

## 2018-02-16 RX ORDER — VENLAFAXINE 75 MG/1
3 TABLET ORAL DAILY
COMMUNITY
End: 2018-02-19 | Stop reason: SDUPTHER

## 2018-02-16 RX ORDER — GABAPENTIN 300 MG/1
2 CAPSULE ORAL 2 TIMES DAILY
COMMUNITY
End: 2018-04-21 | Stop reason: SDUPTHER

## 2018-02-16 RX ORDER — FLUTICASONE PROPIONATE 50 MCG
2 SPRAY, SUSPENSION (ML) NASAL DAILY
COMMUNITY
Start: 2015-06-30 | End: 2018-03-21 | Stop reason: SDUPTHER

## 2018-02-16 RX ORDER — OMEGA-3 FATTY ACIDS/FISH OIL 300-1000MG
CAPSULE ORAL
COMMUNITY

## 2018-02-16 RX ORDER — CALCIUM CITRATE/VITAMIN D3 125-62.5
TABLET ORAL
COMMUNITY
End: 2018-04-21 | Stop reason: ALTCHOICE

## 2018-02-16 RX ORDER — UBIDECARENONE 30 MG
CAPSULE ORAL
COMMUNITY
End: 2018-04-21 | Stop reason: ALTCHOICE

## 2018-02-16 RX ORDER — CHLORAL HYDRATE 500 MG
CAPSULE ORAL
COMMUNITY
End: 2018-04-21 | Stop reason: ALTCHOICE

## 2018-02-16 RX ORDER — MULTIVITAMIN
CAPSULE ORAL
COMMUNITY

## 2018-02-16 RX ORDER — GUARN/MA-HUANG/P.GIN/S.GINSENG
TABLET ORAL
COMMUNITY
End: 2018-04-21 | Stop reason: ALTCHOICE

## 2018-02-16 RX ORDER — LORATADINE AND PSEUDOEPHEDRINE 10; 240 MG/1; MG/1
1 TABLET, EXTENDED RELEASE ORAL DAILY
COMMUNITY
End: 2018-04-21 | Stop reason: ALTCHOICE

## 2018-02-17 ENCOUNTER — OFFICE VISIT (OUTPATIENT)
Dept: INTERNAL MEDICINE CLINIC | Facility: CLINIC | Age: 43
End: 2018-02-17
Payer: COMMERCIAL

## 2018-02-17 VITALS
HEART RATE: 82 BPM | OXYGEN SATURATION: 98 % | DIASTOLIC BLOOD PRESSURE: 98 MMHG | RESPIRATION RATE: 18 BRPM | TEMPERATURE: 98.6 F | BODY MASS INDEX: 45.99 KG/M2 | HEIGHT: 67 IN | WEIGHT: 293 LBS | SYSTOLIC BLOOD PRESSURE: 136 MMHG

## 2018-02-17 DIAGNOSIS — M77.8 TENDINITIS OF RIGHT SHOULDER: ICD-10-CM

## 2018-02-17 DIAGNOSIS — I10 BENIGN HYPERTENSION: Primary | ICD-10-CM

## 2018-02-17 DIAGNOSIS — F32.A DEPRESSION, UNSPECIFIED DEPRESSION TYPE: ICD-10-CM

## 2018-02-17 DIAGNOSIS — R00.2 PALPITATIONS: ICD-10-CM

## 2018-02-17 PROBLEM — F41.9 ANXIETY: Status: ACTIVE | Noted: 2017-03-09

## 2018-02-17 PROCEDURE — 99214 OFFICE O/P EST MOD 30 MIN: CPT | Performed by: PHYSICIAN ASSISTANT

## 2018-02-17 RX ORDER — CHLORTHALIDONE 25 MG/1
25 TABLET ORAL DAILY
Qty: 90 TABLET | Refills: 0 | Status: SHIPPED | OUTPATIENT
Start: 2018-02-17 | End: 2018-05-22 | Stop reason: SDUPTHER

## 2018-02-17 RX ORDER — NAPROXEN 250 MG/1
250 TABLET ORAL 2 TIMES DAILY WITH MEALS
COMMUNITY

## 2018-02-17 RX ORDER — CETIRIZINE HYDROCHLORIDE 10 MG/1
10 TABLET ORAL DAILY
COMMUNITY

## 2018-02-17 NOTE — PROGRESS NOTES
Assessment/Plan:  Will treat her high blood pressure with a diuretic  Will get metabolic screening referral for tendinitis of her shoulder a stress test for her exertional palpitations and shortness of breath  I recommended counseling  She is depressed but not suicidal   I recommended that she quit drinking and smoking follow-up in a month    No problem-specific Assessment & Plan notes found for this encounter  Diagnoses and all orders for this visit:    Benign hypertension  -     Comprehensive metabolic panel; Future  -     CBC and differential; Future  -     Hemoglobin A1c; Future  -     Lipid panel; Future  -     TSH, 3rd generation; Future  -     UA w Reflex to Microscopic w Reflex to Culture  -     chlorthalidone 25 mg tablet; Take 1 tablet (25 mg total) by mouth daily for 90 days    Tendinitis of right shoulder  -     Ambulatory referral to Orthopedic Surgery; Future    Depression, unspecified depression type    Palpitations  -     Echo stress test w contrast if indicated; Future    Other orders  -     Glatiramer Acetate (COPAXONE) 40 MG/ML SOSY; Inject under the skin  -     CALCIUM PO; Take by mouth  -     Omega-3 Fatty Acids (FISH OIL) 1,000 mg; Take by mouth  -     fluticasone (FLONASE) 50 mcg/act nasal spray; 2 sprays into each nostril daily  -     gabapentin (NEURONTIN) 300 mg capsule; Take 2 capsules by mouth 2 (two) times a day  -     Ginkgo Biloba 50 MG CAPS; Take by mouth  -     Ginsengs-Saw Parks (MULTI GINSENG & SAW PALMETTO) 500 MG CAPS; Take by mouth  -     Ibuprofen 200 MG CAPS; Take by mouth  -     Alpha-Lipoic Acid 100 MG CAPS; Take by mouth  -     loratadine-pseudoephedrine (CLARITIN-D 24-HOUR)  mg per 24 hr tablet; Take 1 tablet by mouth daily  -     Multiple Vitamin (MULTIVITAMIN) capsule; Take by mouth  -     Potassium Gluconate 550 MG TABS; Take by mouth  -     venlafaxine (EFFEXOR) 75 mg tablet;  Take 3 tablets by mouth daily  -     vitamin B-12 (CYANOCOBALAMIN) 50 MCG tablet; Take by mouth  -     Cholecalciferol (VITAMIN D-3) 5000 units TABS; Take by mouth  -     cetirizine (ZyrTEC) 10 mg tablet; Take 10 mg by mouth daily  -     naproxen (NAPROSYN) 250 mg tablet; Take 250 mg by mouth 2 (two) times a day with meals          Subjective:      Patient ID: Valerie Romero is a 37 y o  female  For 2 or 3 months she has had increasing weight gain  She is drinking more and more alcohol especially at night  She feels her pulse is beating in her ears at night and she has had her blood pressure taken a couple of times which has been elevated diastolics in the 37G  For the past 2 months she is having pain in her right shoulder especially when she tries to sleep  She is complaining of increasing exertional shortness of breath with irregular palpitations     She has been getting depressed crying frequently with anhedonia  History of multiple sclerosis following with Neurology  Stable in this regard        The following portions of the patient's history were reviewed and updated as appropriate: allergies, current medications, past family history, past medical history, past social history, past surgical history and problem list     Review of Systems   Constitutional: Positive for appetite change, fatigue, fever and unexpected weight change  Respiratory: Positive for shortness of breath  Cardiovascular: Positive for palpitations  Musculoskeletal: Positive for arthralgias, neck pain and neck stiffness  Objective:    /98   Pulse 82   Temp 98 6 °F (37 °C)   Resp 18   Ht 5' 7" (1 702 m)   Wt (!) 137 kg (303 lb)   SpO2 98%   BMI 47 46 kg/m²      Physical Exam   Constitutional: She is oriented to person, place, and time  She appears well-developed  Obese   HENT:   Head: Normocephalic     Right Ear: External ear normal    Left Ear: External ear normal    Mouth/Throat: Oropharynx is clear and moist    Eyes: Conjunctivae are normal  Pupils are equal, round, and reactive to light  Neck: Neck supple  No thyromegaly present  Cardiovascular: Normal rate, regular rhythm, normal heart sounds and intact distal pulses  Pulmonary/Chest: Effort normal and breath sounds normal    Abdominal: Soft  Bowel sounds are normal    Musculoskeletal: Normal range of motion  Edema: Pain with any movement right shoulder normal range of motion  Neurological: She is alert and oriented to person, place, and time  She has normal reflexes  Skin: Skin is warm and dry     Psychiatric:   Tearful

## 2018-02-19 DIAGNOSIS — F41.9 ANXIETY: Primary | ICD-10-CM

## 2018-02-20 RX ORDER — VENLAFAXINE 75 MG/1
75 TABLET ORAL 3 TIMES DAILY
Qty: 90 TABLET | Refills: 0 | Status: SHIPPED | OUTPATIENT
Start: 2018-02-20 | End: 2018-04-09 | Stop reason: SDUPTHER

## 2018-03-21 ENCOUNTER — TELEPHONE (OUTPATIENT)
Dept: INTERNAL MEDICINE CLINIC | Facility: CLINIC | Age: 43
End: 2018-03-21

## 2018-03-21 DIAGNOSIS — J31.0 RHINITIS, UNSPECIFIED CHRONICITY, UNSPECIFIED TYPE: Primary | ICD-10-CM

## 2018-03-21 RX ORDER — FLUTICASONE PROPIONATE 50 MCG
2 SPRAY, SUSPENSION (ML) NASAL DAILY
Qty: 16 G | Refills: 0 | Status: SHIPPED | OUTPATIENT
Start: 2018-03-21

## 2018-04-09 DIAGNOSIS — F41.9 ANXIETY: ICD-10-CM

## 2018-04-09 RX ORDER — VENLAFAXINE 75 MG/1
75 TABLET ORAL 3 TIMES DAILY
Qty: 90 TABLET | Refills: 3 | Status: SHIPPED | OUTPATIENT
Start: 2018-04-09 | End: 2018-08-07

## 2018-04-10 ENCOUNTER — HOSPITAL ENCOUNTER (OUTPATIENT)
Dept: NON INVASIVE DIAGNOSTICS | Facility: CLINIC | Age: 43
Discharge: HOME | End: 2018-04-10
Payer: COMMERCIAL

## 2018-04-10 DIAGNOSIS — R07.89 CHEST TIGHTNESS: ICD-10-CM

## 2018-04-10 DIAGNOSIS — R00.2 PALPITATIONS: ICD-10-CM

## 2018-04-10 PROCEDURE — 93017 CV STRESS TEST TRACING ONLY: CPT

## 2018-04-11 ENCOUNTER — TELEPHONE (OUTPATIENT)
Dept: INTERNAL MEDICINE CLINIC | Facility: CLINIC | Age: 43
End: 2018-04-11

## 2018-04-11 LAB
MAX DIASTOLIC BP: 96 MMHG
MAX HEART RATE: 150 BPM
MAX PREDICTED HEART RATE: 175 BPM
MAX. SYSTOLIC BP: 184 MMHG
PROTOCOL NAME: NORMAL
TARGET HR FORMULA: NORMAL
TEST INDICATION: NORMAL
TIME IN EXERCISE PHASE: NORMAL

## 2018-04-11 PROCEDURE — 93306 TTE W/DOPPLER COMPLETE: CPT | Performed by: INTERNAL MEDICINE

## 2018-04-17 ENCOUNTER — TELEPHONE (OUTPATIENT)
Dept: INTERNAL MEDICINE CLINIC | Facility: CLINIC | Age: 43
End: 2018-04-17

## 2018-04-17 DIAGNOSIS — R79.89 ABNORMAL LIVER FUNCTION TEST: Primary | ICD-10-CM

## 2018-04-17 DIAGNOSIS — E83.52 HYPERCALCEMIA: ICD-10-CM

## 2018-04-17 NOTE — TELEPHONE ENCOUNTER
NEEDS RESULTS OF THE BLOOD WORK THAT WAS DONE ON February 20,2018  HAD THIS DONE AT [address] [place] [address]    PLEASE CALL PATIENT [phone]

## 2018-04-17 NOTE — TELEPHONE ENCOUNTER
Received results from [facility] and gave to jair for dr ralph to review- forwarding message to dr ralph

## 2018-04-18 LAB — HBA1C MFR BLD HPLC: 5.3 %

## 2018-04-18 RX ORDER — UBIDECARENONE 30 MG
CAPSULE ORAL
COMMUNITY
End: 2018-04-21 | Stop reason: SDUPTHER

## 2018-04-18 RX ORDER — MULTIVIT-MINS NO.63/IRON/FOLIC 27 MG-1 MG
TABLET ORAL
COMMUNITY
End: 2018-04-21 | Stop reason: CLARIF

## 2018-04-21 ENCOUNTER — OFFICE VISIT (OUTPATIENT)
Dept: INTERNAL MEDICINE CLINIC | Facility: CLINIC | Age: 43
End: 2018-04-21
Payer: COMMERCIAL

## 2018-04-21 ENCOUNTER — TELEPHONE (OUTPATIENT)
Dept: INTERNAL MEDICINE CLINIC | Facility: CLINIC | Age: 43
End: 2018-04-21

## 2018-04-21 VITALS
HEIGHT: 66 IN | SYSTOLIC BLOOD PRESSURE: 128 MMHG | WEIGHT: 293 LBS | BODY MASS INDEX: 47.09 KG/M2 | HEART RATE: 74 BPM | OXYGEN SATURATION: 98 % | DIASTOLIC BLOOD PRESSURE: 92 MMHG

## 2018-04-21 DIAGNOSIS — E83.52 HYPERCALCEMIA: ICD-10-CM

## 2018-04-21 DIAGNOSIS — F41.9 ANXIETY: ICD-10-CM

## 2018-04-21 DIAGNOSIS — F32.A DEPRESSION, UNSPECIFIED DEPRESSION TYPE: ICD-10-CM

## 2018-04-21 DIAGNOSIS — E66.01 MORBID OBESITY (HCC): ICD-10-CM

## 2018-04-21 DIAGNOSIS — G35 MULTIPLE SCLEROSIS (HCC): ICD-10-CM

## 2018-04-21 DIAGNOSIS — IMO0001 ALCOHOLISM /ALCOHOL ABUSE: ICD-10-CM

## 2018-04-21 DIAGNOSIS — I10 BENIGN HYPERTENSION: Primary | ICD-10-CM

## 2018-04-21 PROCEDURE — 99214 OFFICE O/P EST MOD 30 MIN: CPT | Performed by: INTERNAL MEDICINE

## 2018-04-21 PROCEDURE — 3008F BODY MASS INDEX DOCD: CPT | Performed by: INTERNAL MEDICINE

## 2018-04-21 RX ORDER — GABAPENTIN 300 MG/1
300 CAPSULE ORAL 3 TIMES DAILY
Qty: 270 CAPSULE | Refills: 3 | Status: SHIPPED | OUTPATIENT
Start: 2018-04-21

## 2018-04-21 RX ORDER — VALSARTAN 160 MG/1
160 TABLET ORAL DAILY
Qty: 90 TABLET | Refills: 3 | Status: SHIPPED | OUTPATIENT
Start: 2018-04-21 | End: 2018-07-20 | Stop reason: CLARIF

## 2018-04-21 RX ORDER — ZOLPIDEM TARTRATE 5 MG/1
5 TABLET ORAL
Qty: 30 TABLET | Refills: 0 | Status: SHIPPED | OUTPATIENT
Start: 2018-04-21 | End: 2018-05-24 | Stop reason: SDUPTHER

## 2018-04-21 NOTE — PROGRESS NOTES
Assessment/Plan:       Diagnoses and all orders for this visit:    Benign hypertension    Multiple sclerosis ([facility] [address] )    Anxiety    Depression, unspecified depression type    Morbid obesity ([facility] [address] )    Other orders  -     Discontinue: calcium polycarbophil (FIBERCON) 625 mg chewable tablet; Chew  -     Discontinue: Cholecalciferol 40828 units TABS; Take by mouth  -     Discontinue: Prenatal Vit-Fe Fumarate-FA (M-VIT) tablet; Infuse into a venous catheter  -     Discontinue: Potassium Gluconate 550 MG TABS; Take by mouth              Subjective:      Patient ID: Osei Ivan is a 37 y o  female  Follow-up visit for a 27-year-old female    The patient has MS treated by Neurology  Apparently, the neurologist wants us to handle gabapentin which she is taking for neuropathic pain  Dose of 300 mg 3 times a day does not seem to me to be excessive at all    The patient verbalizes complaint of insomnia and would like  A sleep aid  Very importantly, the patient verbalizes feared that she has a functional alcoholic  She drinks a good L of wine a day  She drinks it because it helps her to go to sleep  The patient endorses a diagnosis of alcoholism  This has never been treated  She has never been in counseling  Fortunately, no history of DUI, no history of blackout  Diagnosis of anxiety depression; currently treated with venlafaxine  Venlafaxine was started by Dr Arvin Head here in this practice     A recent walking stress test was done due to complaint of palpitations; this was normal       Ancillary diagnosis are morbid obesity, and fatty liver  The patient consistently runs elevated transaminases  She has had ultrasounds done in the past which have documented this issue  Recent laboratory testing was done or premises  Review documents the following abnormal findings: Slight elevated transaminases with AST 58 and , this is consistent with fatty liver  Slight elevated calcium of 10 3    This is highly likely to be due to chlorthalidone, although we need to check parathyroid hormone  Slightly elevated hemoglobin and hematocrit -? 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  ,   does not have any pertinent problems on file  ,   has a past surgical history that includes Sinus surgery and Rectal surgery  ,  family history includes Hypertension in her father and mother  ,   reports that she has quit smoking  She uses smokeless tobacco  She reports that she drinks alcohol  She reports that she does not use drugs  ,  is allergic to banana; latex; and pollen extract     Current Outpatient Prescriptions   Medication Sig Dispense Refill    cetirizine (ZyrTEC) 10 mg tablet Take 10 mg by mouth daily      chlorthalidone 25 mg tablet Take 1 tablet (25 mg total) by mouth daily for 90 days 90 tablet 0    fluticasone (FLONASE) 50 mcg/act nasal spray 2 sprays into each nostril daily 16 g 0    gabapentin (NEURONTIN) 300 mg capsule Take 2 capsules by mouth 2 (two) times a day      Glatiramer Acetate (COPAXONE) 40 MG/ML SOSY Inject under the skin      Ibuprofen 200 MG CAPS Take by mouth      Multiple Vitamin (MULTIVITAMIN) capsule Take by mouth      naproxen (NAPROSYN) 250 mg tablet Take 250 mg by mouth 2 (two) times a day with meals      venlafaxine (EFFEXOR) 75 mg tablet Take 1 tablet (75 mg total) by mouth 3 (three) times a day for 120 days 90 tablet 3     No current facility-administered medications for this visit  Review of Systems   Constitutional: Negative for chills and fever  HENT: Negative for sore throat and trouble swallowing  Eyes: Negative for pain  Respiratory: Negative for cough, shortness of breath and wheezing      Gastrointestinal: Negative for abdominal pain, diarrhea, nausea and vomiting  Endocrine: Negative for cold intolerance and heat intolerance  Genitourinary: Negative for dysuria, frequency and pelvic pain  Musculoskeletal: Negative for arthralgias and joint swelling  Skin: Negative for rash and wound  Allergic/Immunologic: Negative for immunocompromised state  Neurological: Negative for dizziness, seizures, syncope and headaches  Psychiatric/Behavioral: Positive for dysphoric mood  The patient is nervous/anxious  Objective:  Vitals:    04/21/18 0933   BP: 128/92   Pulse: 74   SpO2: 98%      Physical Exam   Constitutional: She is oriented to person, place, and time  She appears well-developed and well-nourished  Morbid obesity noted   HENT:   Head: Normocephalic and atraumatic  Eyes: EOM are normal  Pupils are equal, round, and reactive to light  Neck: Normal range of motion  Neck supple  No tracheal deviation present  No thyromegaly present  Pulmonary/Chest: Effort normal  No respiratory distress  Abdominal: Soft  There is no tenderness  Musculoskeletal: Normal range of motion  She exhibits no tenderness or deformity  Neurological: She is alert and oriented to person, place, and time  Coordination normal    Skin: Skin is warm  Psychiatric: She has a normal mood and affect   Judgment normal

## 2018-04-21 NOTE — PATIENT INSTRUCTIONS
Issues addressed today  Gabapentin will be prescribed in his practice   Insomnia, with associated use of alcohol to improve sleep  Obviously, counterproductive  Trial of zolpidem 5 mg H S  Also, I recommend use of   Vivitrol or the oral equivalent a to eliminate alcohol cravings  I think counseling is an order on this to     Blood pressure, control not adequate  Recommend addition of valsartan 160 mg daily       Elevated transaminases; most likely diagnosis is the already proposed fatty liver diagnosis  She has been   seen by Gi  Hypercalcemia, very minimal   The best suspect here is  Chlorthalidone but  Hyperparathyroidism must be ruled out  That can be done today by checking PTH level    Morbid obesity  Recommendation here is consultation for bariatric surgery      Revisit here May 5th

## 2018-04-27 ENCOUNTER — TELEPHONE (OUTPATIENT)
Dept: NEUROLOGY | Facility: CLINIC | Age: 43
End: 2018-04-27

## 2018-04-27 NOTE — TELEPHONE ENCOUNTER
Received Social Security Disability form regarding functional capacity  Patient last saw Dr Joey Guzman a year ago and has seen Dr Anali Tompkins since  I explained that we do not test for functional capacity and reminded her her last visit with Dr Joey Guzman was a year ago  I requested a call back

## 2018-05-22 DIAGNOSIS — I10 BENIGN HYPERTENSION: ICD-10-CM

## 2018-05-22 RX ORDER — CHLORTHALIDONE 25 MG/1
25 TABLET ORAL DAILY
Qty: 90 TABLET | Refills: 3 | Status: SHIPPED | OUTPATIENT
Start: 2018-05-22 | End: 2018-08-20

## 2018-05-22 NOTE — TELEPHONE ENCOUNTER
From: Marianna Becerra  Sent: 5/22/2018 9:11 AM EDT  Subject: Medication Renewal Request    Aundrea Ramachandran would like a refill of the following medications:     chlorthalidone 25 mg tablet Bárbara Wilcox PA-C]    Preferred pharmacy: [place] [id number] [address][id number]

## 2018-05-24 DIAGNOSIS — F41.9 ANXIETY: ICD-10-CM

## 2018-05-24 RX ORDER — ZOLPIDEM TARTRATE 5 MG/1
5 TABLET ORAL
Qty: 30 TABLET | Refills: 0 | Status: SHIPPED | OUTPATIENT
Start: 2018-05-24 | End: 2018-07-21 | Stop reason: SDUPTHER

## 2018-05-24 NOTE — TELEPHONE ENCOUNTER
From: Milady Serrano  Sent: 5/24/2018 9:18 AM EDT  Subject: Medication Renewal Request    Aundrea Villa would like a refill of the following medications:     zolpidem (AMBIEN) 5 mg tablet Jayashree Blackman MD]    Preferred pharmacy: [id number] [address][id number]

## 2018-07-20 ENCOUNTER — TELEPHONE (OUTPATIENT)
Dept: INTERNAL MEDICINE CLINIC | Facility: CLINIC | Age: 43
End: 2018-07-20

## 2018-07-20 DIAGNOSIS — I10 BENIGN HYPERTENSION: Primary | ICD-10-CM

## 2018-07-20 RX ORDER — IRBESARTAN 300 MG/1
300 TABLET ORAL
Qty: 90 TABLET | Refills: 3 | Status: SHIPPED | OUTPATIENT
Start: 2018-07-20 | End: 2018-07-23 | Stop reason: CLARIF

## 2018-07-20 NOTE — TELEPHONE ENCOUNTER
Evie Em from [facility] called in regards to pt's valsartan 160mg  Medication will be on backorder until the end of the year   Needs alternative please advise     [facility] [place]

## 2018-07-21 DIAGNOSIS — F41.9 ANXIETY: ICD-10-CM

## 2018-07-23 ENCOUNTER — TELEPHONE (OUTPATIENT)
Dept: INTERNAL MEDICINE CLINIC | Facility: CLINIC | Age: 43
End: 2018-07-23

## 2018-07-23 DIAGNOSIS — I10 BENIGN HYPERTENSION: Primary | ICD-10-CM

## 2018-07-23 RX ORDER — LOSARTAN POTASSIUM 100 MG/1
100 TABLET ORAL DAILY
Qty: 90 TABLET | Refills: 3 | Status: SHIPPED | OUTPATIENT
Start: 2018-07-23 | End: 2018-10-21

## 2018-07-23 NOTE — TELEPHONE ENCOUNTER
She took the new med Adilson prescribed, Irbesartan, Sat and Sun night & it's making her feel very tired, light headed, Margaretmary Baumgarten    & her BP is 140/90    Can Adilson prescribe another med ???

## 2018-07-23 NOTE — TELEPHONE ENCOUNTER
Clarification needed on RX's   Irbesartan was prescribed on the 20 th    Then just rec'ed RX for losartan     Is the Losartan to replace the Irbesartan? ??

## 2018-07-25 RX ORDER — ZOLPIDEM TARTRATE 5 MG/1
TABLET ORAL
Qty: 30 TABLET | Refills: 0 | Status: SHIPPED | OUTPATIENT
Start: 2018-07-25

## 2018-10-31 ENCOUNTER — TELEPHONE (OUTPATIENT)
Dept: INTERNAL MEDICINE CLINIC | Facility: CLINIC | Age: 43
End: 2018-10-31

## 2019-06-04 ENCOUNTER — TELEPHONE (OUTPATIENT)
Dept: INTERNAL MEDICINE CLINIC | Facility: CLINIC | Age: 44
End: 2019-06-04

## 2025-05-27 ENCOUNTER — TELEPHONE (OUTPATIENT)
Age: 50
End: 2025-05-27